# Patient Record
Sex: MALE | Race: ASIAN
[De-identification: names, ages, dates, MRNs, and addresses within clinical notes are randomized per-mention and may not be internally consistent; named-entity substitution may affect disease eponyms.]

---

## 2019-12-23 ENCOUNTER — HOSPITAL ENCOUNTER (EMERGENCY)
Dept: HOSPITAL 72 - EMR | Age: 79
LOS: 1 days | Discharge: HOME | End: 2019-12-24
Payer: MEDICARE

## 2019-12-23 VITALS — SYSTOLIC BLOOD PRESSURE: 137 MMHG | DIASTOLIC BLOOD PRESSURE: 79 MMHG

## 2019-12-23 VITALS — HEIGHT: 66 IN | WEIGHT: 160 LBS | BODY MASS INDEX: 25.71 KG/M2

## 2019-12-23 VITALS — SYSTOLIC BLOOD PRESSURE: 142 MMHG | DIASTOLIC BLOOD PRESSURE: 77 MMHG

## 2019-12-23 DIAGNOSIS — R10.13: Primary | ICD-10-CM

## 2019-12-23 DIAGNOSIS — I10: ICD-10-CM

## 2019-12-23 DIAGNOSIS — N30.00: ICD-10-CM

## 2019-12-23 DIAGNOSIS — N17.9: ICD-10-CM

## 2019-12-23 LAB
ADD MANUAL DIFF: NO
ALBUMIN SERPL-MCNC: 3.7 G/DL (ref 3.4–5)
ALBUMIN/GLOB SERPL: 0.8 {RATIO} (ref 1–2.7)
ALP SERPL-CCNC: 74 U/L (ref 46–116)
ALT SERPL-CCNC: 39 U/L (ref 12–78)
ANION GAP SERPL CALC-SCNC: 16 MMOL/L (ref 5–15)
APPEARANCE UR: CLEAR
APTT PPP: YELLOW S
AST SERPL-CCNC: 28 U/L (ref 15–37)
BASOPHILS NFR BLD AUTO: 1.4 % (ref 0–2)
BILIRUB DIRECT SERPL-MCNC: 0.2 MG/DL (ref 0–0.3)
BILIRUB SERPL-MCNC: 1.3 MG/DL (ref 0.2–1)
BUN SERPL-MCNC: 35 MG/DL (ref 7–18)
CALCIUM SERPL-MCNC: 9.2 MG/DL (ref 8.5–10.1)
CHLORIDE SERPL-SCNC: 100 MMOL/L (ref 98–107)
CO2 SERPL-SCNC: 20 MMOL/L (ref 21–32)
CREAT SERPL-MCNC: 1.6 MG/DL (ref 0.55–1.3)
EOSINOPHIL NFR BLD AUTO: 0.8 % (ref 0–3)
ERYTHROCYTE [DISTWIDTH] IN BLOOD BY AUTOMATED COUNT: 12.3 % (ref 11.6–14.8)
GLOBULIN SER-MCNC: 4.6 G/DL
GLUCOSE UR STRIP-MCNC: NEGATIVE MG/DL
HCT VFR BLD CALC: 46.5 % (ref 42–52)
HGB BLD-MCNC: 16.6 G/DL (ref 14.2–18)
KETONES UR QL STRIP: (no result)
LEUKOCYTE ESTERASE UR QL STRIP: (no result)
LYMPHOCYTES NFR BLD AUTO: 33.8 % (ref 20–45)
MCV RBC AUTO: 89 FL (ref 80–99)
MONOCYTES NFR BLD AUTO: 8 % (ref 1–10)
NEUTROPHILS NFR BLD AUTO: 56.1 % (ref 45–75)
NITRITE UR QL STRIP: NEGATIVE
PH UR STRIP: 5 [PH] (ref 4.5–8)
PLATELET # BLD: 206 K/UL (ref 150–450)
POTASSIUM SERPL-SCNC: 3.9 MMOL/L (ref 3.5–5.1)
PROT UR QL STRIP: (no result)
RBC # BLD AUTO: 5.21 M/UL (ref 4.7–6.1)
SODIUM SERPL-SCNC: 136 MMOL/L (ref 136–145)
SP GR UR STRIP: 1.02 (ref 1–1.03)
UROBILINOGEN UR-MCNC: 1 MG/DL (ref 0–1)
WBC # BLD AUTO: 8.2 K/UL (ref 4.8–10.8)

## 2019-12-23 PROCEDURE — 85025 COMPLETE CBC W/AUTO DIFF WBC: CPT

## 2019-12-23 PROCEDURE — 74176 CT ABD & PELVIS W/O CONTRAST: CPT

## 2019-12-23 PROCEDURE — 96365 THER/PROPH/DIAG IV INF INIT: CPT

## 2019-12-23 PROCEDURE — 82248 BILIRUBIN DIRECT: CPT

## 2019-12-23 PROCEDURE — 83690 ASSAY OF LIPASE: CPT

## 2019-12-23 PROCEDURE — 96361 HYDRATE IV INFUSION ADD-ON: CPT

## 2019-12-23 PROCEDURE — 96375 TX/PRO/DX INJ NEW DRUG ADDON: CPT

## 2019-12-23 PROCEDURE — 80053 COMPREHEN METABOLIC PANEL: CPT

## 2019-12-23 PROCEDURE — 99284 EMERGENCY DEPT VISIT MOD MDM: CPT

## 2019-12-23 PROCEDURE — 96376 TX/PRO/DX INJ SAME DRUG ADON: CPT

## 2019-12-23 PROCEDURE — 36415 COLL VENOUS BLD VENIPUNCTURE: CPT

## 2019-12-23 PROCEDURE — 81003 URINALYSIS AUTO W/O SCOPE: CPT

## 2019-12-23 NOTE — NUR
ED Nurse Note:



Pt brought in by RACHEL WOMACK 82Jenna from [facility] for c/o abdominal pain 
onset one week ago, worse today. Pt denies n/v, but does report diarrhea. Pt 
also states decreased oral intake due to pain. Pt states abdominal pain is 
10/10, aching in nature. Pt is aaox4, breathing is normal, no cardiac distress 
noted. Pt placed on cardiac monitor. Pt states hx of HTN, but does not take 
medication.

## 2019-12-23 NOTE — DIAGNOSTIC IMAGING REPORT
INDICATION: Abdominal pain

 

TECHNIQUE: Continuous helical transaxial imaging of the abdomen and pelvis was

obtained from the lung bases to the pubic symphysis. No intravenous contrast was

administered. Coronal 2-D reformats were also obtained. Automatic Exposure Control

was utilized.

 

 

Total Dose length Product (DLP):  1222.3 mGycm

 

CT Dose Index Volume (CTDIvol):   28 mGy

 

Comparison: none

 

FINDINGS: 

 

Lungs: Heterogeneous focus of ovoid calcification noted in the left infrahilar region

associated with some reticular densities and likely representative of scarring..

 

Liver: Unremarkable

 

Gallbladder/biliary system: Cholecystectomy clips noted. Gallbladder is absent. No

biliary ductal dilatation identified on this study..

 

Spleen: Unremarkable

 

Pancreas: Unremarkable

 

Kidneys: Unremarkable.

 

Adrenal glands: Unremarkable

 

Bowel: Few diverticula noted within the colon. No evidence of acute diverticulitis.

 

Bladder: Normal

 

Aorta/IVC: Moderate calcification of the aorta and iliac arteries demonstrated.

 

Peritoneum: There is no free fluid.

 

Appendix: Normal

 

Bones: There is generalized osteopenia present. Vertebral endplate osteophytes

demonstrated at multiple levels. Hypertrophied facets noted multiple levels.

 

IMPRESSION:

 

No acute findings. Incidental findings as above.

 

[facility] has communicated the preliminary results to the Emergency

Department.  Their findings are largely concordant with this report.

 

Note:  Evaluation of solid organs is limited on non contrast imaging.

 

 

 

The CT scanner at [facility] is accredited by the [employer] College [employer]

[employer]iology and the scans are performed using dose optimization techniques as

appropriate to a performed exam including Automatic Exposure control.

## 2019-12-23 NOTE — EMERGENCY ROOM REPORT
History of Present Illness


General


Source:  Patient, EMS





Present Illness


HPI


This is a 79-year-old  male who history of hypertension but not on 

medication.  He presents with complaint abdominal pain.  Pain been ongoing for 

over a week.  Pain is epigastric in area.  No vomiting.  Has diarrhea.  Pain is 

sharp and crampy.  No fever chills.  No dysuria frequency.  No hematuria.  

Nothing made it better.  Palpation made it worse.  Pain is 8 out of 10.


Allergies:  


Coded Allergies:  


     No Known Allergies (Unverified , 12/23/19)





Patient History


Past Medical History:  see triage record, old chart reviewed, HTN


Past Surgical History:  other


Pertinent Family History:  none


Social History:  Denies: smoking


Immunizations:  other


Reviewed Nursing Documentation:  PMH: Agreed; PSxH: Agreed





Review of Systems


Eye:  Denies: eye pain, blurred vision


ENT:  Denies: ear pain, nose congestion, throat swelling


Respiratory:  Denies: cough, shortness of breath


Cardiovascular:  Denies: chest pain, palpitations


Gastrointestinal:  Reports: abdominal pain, diarrhea; Denies: nausea, vomiting


Musculoskeletal:  Denies: back pain, joint pain


Skin:  Denies: rash


Neurological:  Denies: headache, numbness


Endocrine:  Denies: increased thirst, increased urine


Hematologic/Lymphatic:  Denies: easy bruising


All Other Systems:  negative except mentioned in HPI





Physical Exam


Vitals unremarkable


Sp02 EP Interpretation:  reviewed, normal


General Appearance:  well appearing, no apparent distress, alert


Head:  normocephalic, atraumatic


Eyes:  bilateral eye PERRL, bilateral eye EOMI


ENT:  hearing grossly normal, normal pharynx


Neck:  full range of motion, supple, no meningismus


Respiratory:  chest non-tender, lungs clear, normal breath sounds


Cardiovascular #1:  regular rate, rhythm, no murmur


Gastrointestinal:  no mass, no organomegaly, no bruit, non-distended, abnormal 

bowel sounds - Increased bowel sounds, tenderness - Gastric and supraumbilical


Musculoskeletal:  back normal, normal range of motion, gait/station normal


Psychiatric:  mood/affect normal





Medical Decision Making


Diagnostic Impression:  


 Primary Impression:  


 Abdominal pain


 Qualified Codes:  R10.13 - Epigastric pain


 Additional Impressions:  


 UTI (urinary tract infection)


 Qualified Codes:  N30.00 - Acute cystitis without hematuria


 ANGELINE (acute kidney injury)


ER Course


Patient with abdominal pain.  No evidence of an acute abdomen or obstruction.  

This may be gastritis or peptic ulcer disease.  He does have a mild urinary 

tract infection.  Antibiotics given here.  Pain is better controlled.  Will 

discharge home.


CT/MRI/US Diagnostic Results


CT/MRI/US Diagnostic Results :  


   Imaging Test Ordered:  CT abdomen and pelvis


   Impression


No acute process per radiologist


Status:  improved


Disposition:  HOME, SELF-CARE


Condition:  Stable


Scripts


Pantoprazole* (PROTONIX*) 40 Mg Tablet.dr


40 MG ORAL DAILY, #30 TAB


   Prov: Prasnath Barrett MD         12/23/19 


Nitrofurantoin Monohyd/M-Cryst (Nitrofurantoin Mono-Mcr 100 mg) 100 Mg Capsule


100 MG ORAL Q12H, #14 CAP


   Prov: Prasanth Barrett MD         12/23/19


Patient Instructions:  Abdominal Pain, Adult





Additional Instructions:  


Follow up with your doctor in 2 to 3 days if not better.  Return if worse.











Prasanth Barrett MD Dec 23, 2019 20:59

## 2019-12-23 NOTE — NUR
ED Nurse Note:



Pt states he is feeling better and pain has gone down to a 3/10. Pt is resting 
comfortably in bed. Blankets provided for comfort. Will continue to monitor.

## 2019-12-24 VITALS — SYSTOLIC BLOOD PRESSURE: 121 MMHG | DIASTOLIC BLOOD PRESSURE: 87 MMHG

## 2019-12-24 NOTE — NUR
ER DISCHARGE NOTE:



Patient is cleared to be discharged per ERMD, pt is aox4, on room air, with 
stable vital signs. pt was given dc and prescription instructions, pt was able 
to verbalize understanding, pt id band and iv site removed without 
complications. pt took all belongings. pt being transported home by [id number], report given to s crew.